# Patient Record
Sex: MALE | Race: WHITE | NOT HISPANIC OR LATINO | Employment: OTHER | URBAN - METROPOLITAN AREA
[De-identification: names, ages, dates, MRNs, and addresses within clinical notes are randomized per-mention and may not be internally consistent; named-entity substitution may affect disease eponyms.]

---

## 2020-07-09 ENCOUNTER — TRANSCRIBE ORDERS (OUTPATIENT)
Dept: PHYSICAL THERAPY | Facility: CLINIC | Age: 55
End: 2020-07-09

## 2020-07-09 DIAGNOSIS — M79.2 CERVICAL SPINE ARTHRITIS WITH NERVE PAIN: Primary | ICD-10-CM

## 2020-07-09 DIAGNOSIS — M47.812 CERVICAL SPINE ARTHRITIS WITH NERVE PAIN: Primary | ICD-10-CM

## 2020-07-17 ENCOUNTER — TREATMENT (OUTPATIENT)
Dept: PHYSICAL THERAPY | Facility: CLINIC | Age: 55
End: 2020-07-17

## 2020-07-17 DIAGNOSIS — M79.2 CERVICAL SPINE ARTHRITIS WITH NERVE PAIN: Primary | ICD-10-CM

## 2020-07-17 DIAGNOSIS — M47.812 CERVICAL SPINE ARTHRITIS WITH NERVE PAIN: Primary | ICD-10-CM

## 2020-07-17 PROCEDURE — 97012 MECHANICAL TRACTION THERAPY: CPT | Performed by: PHYSICAL THERAPIST

## 2020-07-17 PROCEDURE — 97161 PT EVAL LOW COMPLEX 20 MIN: CPT | Performed by: PHYSICAL THERAPIST

## 2020-07-17 NOTE — PROGRESS NOTES
Physical Therapy Initial Evaluation and Plan of Care    Patient: Nakul Brown   : 1965  Diagnosis/ICD-10 Code:  Cervical spine arthritis with nerve pain [M47.812, M79.2]  Referring practitioner: Collin Juárez MD PhD    Subjective Evaluation    History of Present Illness  Mechanism of injury: Pt reports to the clinic with neck pain beginning about a month ago that came on quickly and severely with a tingling sensation going down the medial side of his arm into his pinkie. He claims that the pain comes primarily when he is active throughout the day and the only thing that really relieves the pain is resting. The pain is also present in the mornings when he initially gets out of bed and stays pretty consistent throughout the day with it getting worse with strenuous activity. He also reported having low back pain.     Quality of life: good    Pain  Current pain ratin  At best pain rating: 3  At worst pain ratin  Quality: dull ache and radiating (tingling sensation down to pinkie on L hand)  Relieving factors: rest  Aggravating factors: overhead activity, outstretched reach, lifting, movement, standing and repetitive movement  Progression: no change    Diagnostic Tests  No diagnostic tests performed    Treatments  Treatments tried: has been doing stretches/exercises found for neck pain   Current treatment: physical therapy  Patient Goals  Patient goals for therapy: decreased pain, increased motion, increased strength and return to sport/leisure activities  Patient goal: be able to workout without increased pain           Objective          Tenderness   Cervical Spine   Tenderness in the spinous process.     Additional Tenderness Details  Palpable tenderness at C4-5 spinous processes and juntion with more severe tenderness at C7-T1 junction and spinous processes. Tenderness also at C2 transverse process.    Neurological Testing     Sensation   Cervical/Thoracic   Left   Intact: light  touch    Right   Intact: light touch    Comments   Left light touch: C8 T1 .     Shoulder   Left Shoulder   Intact: light touch    Right Shoulder   Intact: light touch    Wrist/Hand   Left   Diminished: light touch    Right   Intact: light touch    Reflexes   Left   Biceps (C5/C6): normal (2+)  Brachioradialis (C6): trace (1+)  Triceps (C7): trace (1+)    Right   Biceps (C5/C6): normal (2+)  Brachioradialis (C6): normal (2+)  Triceps (C7): trace (1+)    Additional Neurological Details  Diminished sensation at L C8/T1 dermatome distributions    Active Range of Motion   Cervical/Thoracic Spine   Cervical    Flexion: 56 degrees   Extension: 34 degrees   Left lateral flexion: 34 degrees   Right lateral flexion: 51 degrees   Left rotation: 40 degrees   Right rotation: 52 degrees   Left Shoulder   Normal active range of motion    Right Shoulder   Normal active range of motion    Additional Active Range of Motion Details  Increased symptoms with R cervical lateral flexion     Passive Range of Motion     Additional Passive Range of Motion Details  Restriction in AA motion towards L side    Strength/Myotome Testing     Left Shoulder     Planes of Motion   Abduction: 4-     Isolated Muscles   Upper trapezius: 5     Right Shoulder     Planes of Motion   Abduction: 5     Isolated Muscles   Upper trapezius: 5     Left Elbow   Flexion: 4-  Extension: 4+    Right Elbow   Flexion: 5  Extension: 5    Left Wrist/Hand   Wrist extension: 5  Wrist flexion: 5     (2nd hand position)    Trial 1: 83 lbs    Trial 2: 60 lbs    Trial 3: 60 lbs    Average: 67.67 lbs    Right Wrist/Hand   Wrist extension: 5  Wrist flexion: 5     (2nd hand position)     Trial 1: 79 lbs    Trial 2: 87 lbs    Trial 3: 80 lbs    Average: 82 lbs    Additional Strength Details  Cervical rotation 5/5 bilaterally   FCU 5/5 L, 5/5 R  Finger adduction 4/5 L, 5/5 R       Tests   Cervical     Left   Negative cervical distraction.     Additional Tests  Details  Distraction decreased symptoms and felt good to pt  Ulnar nerve tension test: negative          Assessment & Plan     Assessment  Impairments: abnormal or restricted ROM, activity intolerance, impaired physical strength and pain with function  Assessment details: Pt is a 54 year old male reporting to the clinic with neck pain and a tingling sensation traveling down his L medial arm into his 5th digit. The patient displayed decreased ROM with stiffness and increased symptoms with all cervical ROMs to the L side. He showed an increase in symptoms with R cervical lateral flexion. Neurological testing consisting of AROM, dermatomes, myotomes, and reflexes displayed decreased activity from the C5 myotome, C6/7 reflexes, and C8/T1 dermatome distributions. PROM in supine showed decreased ROM and pain with L rotation and lateral flexion. Distraction technique was tested and a decrease in pain and increase in pain-free ROM was noted. This patient will benefit from skilled physical therapy to decrease neck pain and neurological symptoms and to increase his ability to do recreational activities and improve his quality of life.   Prognosis: good  Functional Limitations: lifting, pushing, uncomfortable because of pain, sitting, standing, reaching overhead and unable to perform repetitive tasks  Goals  Plan Goals: Short term goals: 3 weeks  1. The patient will be able to run 10 minutes without pain increasing more than a 3/10 to improve his ability to participate in a cardiovascular exercise program.   2. Pt be able to lift weights for 30 minutes without pain increasing to a 4 or greater to improve his overall health.  3. Pt be able to achieve 40 degrees or greater of L cervical lateral flexion to display a decrease in pain and improvements in AROM and functional ability.    Long term goals: 6 weeks   1. Pt be able to workout for the 45 minutes without pain increasing to a 2/10 or greater to display a decrease in pain  and increased ability to do recreational activities.  2. Pt be able to achieve 45 degrees or greater of AROM cervical extension to improve his ability to look up and decrease pain symptoms  3. Pt be able to achieve 50 degrees of L cervical rotation AROM without increases in pain to improve his ability to look over his shoulder while driving.   4. Pt to improve his L biceps, shoulder abd, and finger adduction strength to 5/5 to improve his ability to workout without risk of injury.   5. Pt to improve his L  strength by 10# or greater to improve his  while holding weights/barbell during workouts.         Plan  Therapy options: will be seen for skilled physical therapy services  Planned modality interventions: electrical stimulation/Russian stimulation, TENS, traction, cryotherapy and thermotherapy (hydrocollator packs)  Planned therapy interventions: body mechanics training, flexibility, functional ROM exercises, home exercise program, joint mobilization, stretching, strengthening, spinal/joint mobilization, soft tissue mobilization, motor coordination training and manual therapy  Frequency: 2x week  Duration in visits: 12  Treatment plan discussed with: patient        History # of Personal Factors and/or Comorbidities: LOW (0)  Examination of Body System(s): # of elements: LOW (1-2)  Clinical Presentation: STABLE   Clinical Decision Making: LOW     Timed:         Manual Therapy:       mins  48752;     Therapeutic Exercise:         mins  23084;     Neuromuscular Swathi:        mins  36915;    Therapeutic Activity:          mins  84202;     Gait Training:           mins  18768;     Ultrasound:          mins  49444;    Ionto                                   mins   22490  Self Care                            mins   97458        Un-Timed:  Electrical Stimulation:         mins  02241 (MC );  Dry Needling          mins self-pay  Traction     12     mins 60527  Low Eval     48     Mins  70002  Mod Eval           Mins  20785  High Eval                            Mins  00793  Re-Eval                               mins  63524        Timed Treatment:   0   mins   Total Treatment:     60   mins  PT SIGNATURE: Carolina Espino, PT, DPT    DATE TREATMENT INITIATED: 7/17/2020    Initial Certification  Certification Period: 10/15/2020  I certify that the therapy services are furnished while this patient is under my care.  The services outlined above are required by this patient, and will be reviewed every 90 days.     PHYSICIAN: Collin Juárez MD PhD      DATE:     Please sign and return via fax to [phone].. Thank you, [facility].

## 2020-07-20 ENCOUNTER — TREATMENT (OUTPATIENT)
Dept: PHYSICAL THERAPY | Facility: CLINIC | Age: 55
End: 2020-07-20

## 2020-07-20 DIAGNOSIS — M47.812 CERVICAL SPINE ARTHRITIS WITH NERVE PAIN: Primary | ICD-10-CM

## 2020-07-20 DIAGNOSIS — M79.2 CERVICAL SPINE ARTHRITIS WITH NERVE PAIN: Primary | ICD-10-CM

## 2020-07-20 PROCEDURE — 97140 MANUAL THERAPY 1/> REGIONS: CPT | Performed by: PHYSICAL THERAPIST

## 2020-07-20 PROCEDURE — 97012 MECHANICAL TRACTION THERAPY: CPT | Performed by: PHYSICAL THERAPIST

## 2020-07-20 NOTE — PROGRESS NOTES
Physical Therapy Daily Progress Note    VISIT#: 2/12 in POC.     Subjective   Nakul Brown reports he is doing ok.  Felt better after traction last visit.     Pain Rating (0/10): 4    Objective     See Exercise, Manual, and Modality Logs for complete treatment.     Patient Education: Provided pt with HEP (OA and AA, Upper trap stretch)    Assessment/Plan   Pt continued to have a good response to traction.  Was able to get slightly more AA PROM today.    Plan  Progress per Plan of Care.           Timed:         Manual Therapy:   15      mins  60861;     Therapeutic Exercise:   5      mins  98470;     Neuromuscular Swathi:        mins  90880;    Therapeutic Activity:          mins  23828;     Gait Training:           mins  04455;     Ultrasound:          mins  11550;    Ionto                                   mins   16215  Self Care                            mins   80562    Un-Timed:  Electrical Stimulation:         mins  29287 ( );  Dry Needling          mins self-pay  Traction     12     mins 27252  Low Eval          Mins  52025  Mod Eval          Mins  99728  High Eval                            Mins  08155  Re-Eval                               mins  74980    Timed Treatment:   20   mins   Total Treatment:     32   mins    Carolina Espino, PT, DPT  Physical Therapist

## 2020-07-23 ENCOUNTER — TREATMENT (OUTPATIENT)
Dept: PHYSICAL THERAPY | Facility: CLINIC | Age: 55
End: 2020-07-23

## 2020-07-23 DIAGNOSIS — M54.2 PAIN, NECK: Primary | ICD-10-CM

## 2020-07-23 PROCEDURE — 97140 MANUAL THERAPY 1/> REGIONS: CPT | Performed by: PHYSICAL THERAPIST

## 2020-07-23 PROCEDURE — 97012 MECHANICAL TRACTION THERAPY: CPT | Performed by: PHYSICAL THERAPIST

## 2020-07-23 PROCEDURE — 97110 THERAPEUTIC EXERCISES: CPT | Performed by: PHYSICAL THERAPIST

## 2020-07-28 ENCOUNTER — TREATMENT (OUTPATIENT)
Dept: PHYSICAL THERAPY | Facility: CLINIC | Age: 55
End: 2020-07-28

## 2020-07-28 DIAGNOSIS — M54.2 PAIN, NECK: Primary | ICD-10-CM

## 2020-07-28 PROCEDURE — 97140 MANUAL THERAPY 1/> REGIONS: CPT | Performed by: PHYSICAL THERAPIST

## 2020-07-28 PROCEDURE — 97110 THERAPEUTIC EXERCISES: CPT | Performed by: PHYSICAL THERAPIST

## 2020-07-28 PROCEDURE — 97012 MECHANICAL TRACTION THERAPY: CPT | Performed by: PHYSICAL THERAPIST

## 2020-07-28 NOTE — PROGRESS NOTES
Physical Therapy Daily Progress Note    VISIT#: 4/12    Subjective   Nakul Brown reports feeling good and being able to move his neck more when looking up and to each side, but did say that he has a pain in his L ear that has been lingering for a couple days. Reports still being able to go to the gym and workout with no problems.   Pain Rating (0/10): 1    Objective     See Exercise, Manual, and Modality Logs for complete treatment.       Assessment/Plan  Pt was able to actively and passively move his neck further than previously seen. He responded well to manual therapy and was able to withstand increased pressure with STM and AA glides. Plan to measure cervical AROM next visit and focus more on thoracic spine mobility and strength.   Plan  Progress strengthening /stabilization /functional activity            Timed:         Manual Therapy:    15     mins  29544;     Therapeutic Exercise:    18     mins  76149;     Neuromuscular Swathi:        mins  11855;    Therapeutic Activity:          mins  68015;     Gait Training:           mins  99438;     Ultrasound:          mins  03951;    Ionto                                   mins   64520  Self Care                            mins   79457    Un-Timed:  Electrical Stimulation:         mins  04697 ( );  Dry Needling          mins self-pay  Traction     12     mins 88122  Low Eval          Mins  45642  Mod Eval          Mins  91869  High Eval                            Mins  34086  Re-Eval                               mins  17166    Timed Treatment:   33   mins   Total Treatment:    45    mins    Carolina Espino, PT, DPT  Physical Therapist

## 2020-07-30 ENCOUNTER — TREATMENT (OUTPATIENT)
Dept: PHYSICAL THERAPY | Facility: CLINIC | Age: 55
End: 2020-07-30

## 2020-07-30 DIAGNOSIS — M54.2 PAIN, NECK: Primary | ICD-10-CM

## 2020-07-30 PROCEDURE — 97012 MECHANICAL TRACTION THERAPY: CPT | Performed by: PHYSICAL THERAPIST

## 2020-07-30 PROCEDURE — 97110 THERAPEUTIC EXERCISES: CPT | Performed by: PHYSICAL THERAPIST

## 2020-07-30 PROCEDURE — 97140 MANUAL THERAPY 1/> REGIONS: CPT | Performed by: PHYSICAL THERAPIST

## 2020-07-30 NOTE — PROGRESS NOTES
Physical Therapy Daily Progress Note    VISIT#: 5/12 in POC    Subjective   Nakul Brown reports that he felt good after last treatment and is not experiencing any pain right now.  Pain Rating (0/10): 0    Objective     Cervical AROM  Flexion: 73 degrees   Extension: 47 degrees   Left lateral flexion: 38 degrees   Right lateral flexion: 63 degrees   Left rotation: 78 degrees   Right rotation: 69 degrees     NDI = 5% disability from 20% at initial evaluation    See Exercise, Manual, and Modality Logs for complete treatment.     Goals  Plan Goals: Short term goals: 3 weeks  1. The patient will be able to run 10 minutes without pain increasing more than a 3/10 to improve his ability to participate in a cardiovascular exercise program. - not tested  2. Pt be able to lift weights for 30 minutes without pain increasing to a 4 or greater to improve his overall health. - met  3. Pt be able to achieve 40 degrees or greater of L cervical lateral flexion to display a decrease in pain and improvements in AROM and functional ability. - not met    Long term goals: 6 weeks   1. Pt be able to workout for the 45 minutes without pain increasing to a 2/10 or greater to display a decrease in pain and increased ability to do recreational activities. - met  2. Pt be able to achieve 45 degrees or greater of AROM cervical extension to improve his ability to look up and decrease pain symptoms - met  3. Pt be able to achieve 50 degrees of L cervical rotation AROM without increases in pain to improve his ability to look over his shoulder while driving. - met  4. Pt to improve his L biceps, shoulder abd, and finger adduction strength to 5/5 to improve his ability to workout without risk of injury. - not tested  5. Pt to improve his L  strength by 10# or greater to improve his  while holding weights/barbell during workouts.  - not tested    Assessment/Plan  Pt felt no discomfort or tenderness during manual therapy. He is very active  and plans to continue going to the gym doing strength training 2-3 times/week and flexibility training 1-2 times/week with a . Pt showed increases in all cervical AROM's with all being WNL except for Left lateral flexion. Plan to schedule one appointment a couple weeks from today and assess then as well as discuss plan for d/c and exercises going forward. Will assess goals not tested/not met at next appointment.     Plan  Progress per Plan of Care and Anticipate DC next Visit            Timed:         Manual Therapy:    20     mins  12028;     Therapeutic Exercise:    10     mins  66324;     Neuromuscular Swathi:        mins  54719;    Therapeutic Activity:          mins  93420;     Gait Training:           mins  02311;     Ultrasound:          mins  95632;    Ionto                                   mins   51276  Self Care                            mins   15225    Un-Timed:  Electrical Stimulation:         mins  61110 ( );  Dry Needling          mins self-pay  Traction     12     mins 72723  Low Eval          Mins  02967  Mod Eval          Mins  50846  High Eval                            Mins  81804  Re-Eval                               mins  01893    Timed Treatment:   30   mins   Total Treatment:     42   mins    Carolina Espino, PT, DPT  Physical Therapist

## 2020-11-04 ENCOUNTER — DOCUMENTATION (OUTPATIENT)
Dept: PHYSICAL THERAPY | Facility: CLINIC | Age: 55
End: 2020-11-04

## 2020-11-04 NOTE — PROGRESS NOTES
Discharge Summary  Discharge Summary from Physical Therapy Report      Date of Initial PT visit: 07/17/2020  Number of Visits Seen: 5     Discharge Status of Patient: Pt made good progress with almost complete elimination of symptoms.  Able to work out at gym without difficulty.  Has not returned since 07/30/2020.    Goals: Partially Met  Plan Goals: Short term goals: 3 weeks  1. The patient will be able to run 10 minutes without pain increasing more than a 3/10 to improve his ability to participate in a cardiovascular exercise program. - not tested  2. Pt be able to lift weights for 30 minutes without pain increasing to a 4 or greater to improve his overall health. - met  3. Pt be able to achieve 40 degrees or greater of L cervical lateral flexion to display a decrease in pain and improvements in AROM and functional ability. - not met    Long term goals: 6 weeks   1. Pt be able to workout for the 45 minutes without pain increasing to a 2/10 or greater to display a decrease in pain and increased ability to do recreational activities. - met  2. Pt be able to achieve 45 degrees or greater of AROM cervical extension to improve his ability to look up and decrease pain symptoms - met  3. Pt be able to achieve 50 degrees of L cervical rotation AROM without increases in pain to improve his ability to look over his shoulder while driving. - met  4. Pt to improve his L biceps, shoulder abd, and finger adduction strength to 5/5 to improve his ability to workout without risk of injury. - not tested  5. Pt to improve his L  strength by 10# or greater to improve his  while holding weights/barbell during workouts.  - not tested    Discharge Plan: Continue with current home exercise program as instructed      Date of Discharge : 11/04/2020        Carolina Espino, PT, DPT  Physical Therapist

## 2023-08-14 ENCOUNTER — ANESTHESIA EVENT (OUTPATIENT)
Dept: GASTROENTEROLOGY | Facility: HOSPITAL | Age: 58
End: 2023-08-14
Payer: COMMERCIAL

## 2023-08-15 ENCOUNTER — HOSPITAL ENCOUNTER (OUTPATIENT)
Facility: HOSPITAL | Age: 58
Setting detail: HOSPITAL OUTPATIENT SURGERY
Discharge: HOME | End: 2023-08-15
Attending: INTERNAL MEDICINE | Admitting: INTERNAL MEDICINE
Payer: COMMERCIAL

## 2023-08-15 ENCOUNTER — ANESTHESIA (OUTPATIENT)
Dept: GASTROENTEROLOGY | Facility: HOSPITAL | Age: 58
End: 2023-08-15
Payer: COMMERCIAL

## 2023-08-15 ENCOUNTER — ON CAMPUS - OUTPATIENT (OUTPATIENT)
Dept: URBAN - METROPOLITAN AREA HOSPITAL 85 | Facility: HOSPITAL | Age: 58
End: 2023-08-15
Payer: COMMERCIAL

## 2023-08-15 VITALS
DIASTOLIC BLOOD PRESSURE: 81 MMHG | SYSTOLIC BLOOD PRESSURE: 137 MMHG | WEIGHT: 243.17 LBS | RESPIRATION RATE: 12 BRPM | TEMPERATURE: 98.9 F | HEART RATE: 68 BPM | BODY MASS INDEX: 34.04 KG/M2 | HEIGHT: 71 IN | OXYGEN SATURATION: 97 %

## 2023-08-15 DIAGNOSIS — Z86.010 PERSONAL HISTORY OF COLONIC POLYPS: ICD-10-CM

## 2023-08-15 DIAGNOSIS — D12.2 BENIGN NEOPLASM OF ASCENDING COLON: ICD-10-CM

## 2023-08-15 DIAGNOSIS — D12.8 BENIGN NEOPLASM OF RECTUM: ICD-10-CM

## 2023-08-15 DIAGNOSIS — K57.30 DIVERTICULOSIS OF LARGE INTESTINE WITHOUT PERFORATION OR ABS: ICD-10-CM

## 2023-08-15 LAB — GLUCOSE BLDC GLUCOMTR-MCNC: 166 MG/DL (ref 70–105)

## 2023-08-15 PROCEDURE — 88305 TISSUE EXAM BY PATHOLOGIST: CPT | Performed by: INTERNAL MEDICINE

## 2023-08-15 PROCEDURE — 82948 REAGENT STRIP/BLOOD GLUCOSE: CPT

## 2023-08-15 PROCEDURE — 45385 COLONOSCOPY W/LESION REMOVAL: CPT | Mod: 33 | Performed by: INTERNAL MEDICINE

## 2023-08-15 PROCEDURE — 25010000002 PROPOFOL 200 MG/20ML EMULSION: Performed by: NURSE ANESTHETIST, CERTIFIED REGISTERED

## 2023-08-15 RX ORDER — SODIUM CHLORIDE 9 MG/ML
30 INJECTION, SOLUTION INTRAVENOUS CONTINUOUS
Status: DISCONTINUED | OUTPATIENT
Start: 2023-08-15 | End: 2023-08-15 | Stop reason: HOSPADM

## 2023-08-15 RX ORDER — SODIUM CHLORIDE 9 MG/ML
INJECTION, SOLUTION INTRAVENOUS CONTINUOUS PRN
Status: DISCONTINUED | OUTPATIENT
Start: 2023-08-15 | End: 2023-08-15 | Stop reason: SURG

## 2023-08-15 RX ORDER — LIDOCAINE HYDROCHLORIDE 20 MG/ML
INJECTION, SOLUTION EPIDURAL; INFILTRATION; INTRACAUDAL; PERINEURAL AS NEEDED
Status: DISCONTINUED | OUTPATIENT
Start: 2023-08-15 | End: 2023-08-15 | Stop reason: SURG

## 2023-08-15 RX ORDER — PROPOFOL 10 MG/ML
INJECTION, EMULSION INTRAVENOUS AS NEEDED
Status: DISCONTINUED | OUTPATIENT
Start: 2023-08-15 | End: 2023-08-15 | Stop reason: SURG

## 2023-08-15 RX ADMIN — LIDOCAINE HYDROCHLORIDE 50 MG: 20 INJECTION, SOLUTION EPIDURAL; INFILTRATION; INTRACAUDAL; PERINEURAL at 07:40

## 2023-08-15 RX ADMIN — PROPOFOL 20 MG: 10 INJECTION, EMULSION INTRAVENOUS at 07:49

## 2023-08-15 RX ADMIN — SODIUM CHLORIDE: 9 INJECTION, SOLUTION INTRAVENOUS at 07:35

## 2023-08-15 RX ADMIN — PROPOFOL 150 MG: 10 INJECTION, EMULSION INTRAVENOUS at 07:40

## 2023-08-15 RX ADMIN — SODIUM CHLORIDE 30 ML/HR: 9 INJECTION, SOLUTION INTRAVENOUS at 06:49

## 2023-08-15 RX ADMIN — PROPOFOL 10 MG: 10 INJECTION, EMULSION INTRAVENOUS at 07:55

## 2023-08-15 NOTE — OP NOTE
COLONOSCOPY Procedure Report    Patient Name:  Nakul Brown  YOB: 1965    Date of Surgery:  8/15/2023     Pre-Op Diagnosis:  Personal history of colonic polyps [Z86.010]         Procedure/CPTr Codes:      Procedure(s):  COLONOSCOPY with polypectomy x 4    Staff:  Surgeon(s):  Brissa Gale MD      Anesthesia: Monitored Anesthesia Care    Description of Procedure:  A description of the procedure as well as risks, benefits and alternative methods were explained to the patient who voiced understanding and signed the corresponding consent form. A physical exam was performed and vital signs were monitored throughout the procedure.    A rectal exam was performed which was normal. An Olympus colonoscope was placed into the rectum and proceeded under direct visualization through the colon until the cecum and appendiceal orifice were identified. Careful visualization occurred upon slow withdraw of the scope. The scope was then retroflexed and the distal rectum was visualized. The quality of the prep was good. The procedure was not difficult and there were no immediate complications.    Findings:   Prep was fairly well, terminal ileum cecum ascending transverse ascending colon mucosa looks normal.  2 polyps were removed from ascending colon area 5 and 6 mm with a cold snare.  3 small polyp removed from the rectum measuring 3, 4 and 4 mm from the rectum area with a cold snare.  Diverticulosis noted to the left side of the colon with a moderate-sized internal/external hemorrhoids    Impression:  1.  Multiple polyps were removed as detailed above.  2.  Extensive diverticulosis.  3.  Moderate-sized internal/external hemorrhoids    Recommendations:  Follow up with GI clinic as needed  Follow up with PCP as scheduled  Follow up with biopsy report  Repeat colonoscopy in 5 years  Benefiber 1 scoop 2x/day       Brissa Gale MD     Date: 8/15/2023    Time: 08:03 EDT

## 2023-08-15 NOTE — ANESTHESIA POSTPROCEDURE EVALUATION
Patient: Nakul Brown    Procedure Summary       Date: 08/15/23 Room / Location: [facility] ENDOSCOPY 4 / [facility] ENDOSCOPY    Anesthesia Start: 0735 Anesthesia Stop: 0800    Procedure: COLONOSCOPY with polypectomy x 4 (Rectum) Diagnosis:       Personal history of colonic polyps      (Personal history of colonic polyps [Z86.010])    Surgeons: Brissa Gale MD Provider: Pablo Weiss MD    Anesthesia Type: general ASA Status: 3            Anesthesia Type: general    Vitals  Vitals Value Taken Time   /81 08/15/23 0828   Temp     Pulse 69 08/15/23 0833   Resp 12 08/15/23 0828   SpO2 95 % 08/15/23 0833   Vitals shown include unvalidated device data.        Post Anesthesia Care and Evaluation    Patient location during evaluation: PHASE II  Patient participation: complete - patient participated  Level of consciousness: awake  Pain scale: See nurse's notes for pain score.  Pain management: adequate    Airway patency: patent  Anesthetic complications: No anesthetic complications  PONV Status: none  Cardiovascular status: acceptable  Respiratory status: acceptable and spontaneous ventilation  Hydration status: acceptable    Comments: Patient seen and examined postoperatively; vital signs stable; SpO2 greater than or equal to 90%; cardiopulmonary status stable; nausea/vomiting adequately controlled; pain adequately controlled; no apparent anesthesia complications; patient discharged from anesthesia care when discharge criteria were met

## 2023-08-15 NOTE — H&P
Patient Care Team:  Damian Barragan MD as PCP - General (Internal Medicine)      Subjective .     History of present illness:    Nakul Brown is a 57 y.o. male who presents today for Procedure(s):  COLONOSCOPY with polypectomy x 4 for the indications listed below.     The updated Patient Profile was reviewed prior to the procedure, in conjunction with the Physical Exam, including medical conditions, surgical procedures, medications, allergies, family history and social history.     Pre-operatively, I reviewed the indication(s) for the procedure, the risks of the procedure [including but not limited to: unexpected bleeding possibly requiring hospitalization and/or unplanned repeat procedures, perforation possibly requiring surgical treatment, missed lesions and complications of sedation/MAC (also explained by anesthesia staff)].     I have evaluated the patient for risks associated with the planned anesthesia and the procedure to be performed and find the patient an acceptable candidate for IV sedation.    Multiple opportunities were provided for any questions or concerns, and all questions were answered satisfactorily before any anesthesia was administered. We will proceed with the planned procedure.      ASSESSMENT/PLAN:  57 years old male with a history of polyps    Past Medical History:  Past Medical History:   Diagnosis Date    Afib     Deaf     left ear    Depression     Diabetes mellitus     GERD (gastroesophageal reflux disease)     Gout     Hypertension     Sleep apnea     cpap, severe SA       Past Surgical History:  Past Surgical History:   Procedure Laterality Date    APPENDECTOMY      BRAIN BIOPSY      brain tumor removed negative for cancer       Social History:  Social History     Tobacco Use    Smoking status: Former     Packs/day: 3.00     Years: 10.00     Pack years: 30.00     Types: Cigars, Cigarettes     Quit date: 3/22/2021     Years since quittin.4   Substance Use Topics    Alcohol  use: Yes     Alcohol/week: 4.0 standard drinks     Types: 3 Cans of beer, 1 Shots of liquor per week    Drug use: Not Currently       Family History:  History reviewed. No pertinent family history.    Medications:  Medications Prior to Admission   Medication Sig Dispense Refill Last Dose    allopurinol (ZYLOPRIM) 300 MG tablet allopurinol 300 mg tablet   8/14/2023    aspirin 81 MG EC tablet Take 1 tablet by mouth Daily. Hold DOS   8/14/2023    atorvastatin (LIPITOR) 40 MG tablet Take 1 tablet by mouth Daily.   8/14/2023    DULoxetine (CYMBALTA) 60 MG capsule Take 1 capsule by mouth Daily.   8/14/2023    metFORMIN (GLUCOPHAGE) 500 MG tablet Take 1 tablet by mouth 2 (Two) Times a Day With Meals. Hold DOS   8/14/2023    metoprolol succinate XL (TOPROL-XL) 25 MG 24 hr tablet Take 1 tablet by mouth Daily.   8/14/2023    pantoprazole (PROTONIX) 40 MG EC tablet Take 1 tablet by mouth Daily.   8/14/2023    polycarbophil 625 MG tablet tablet Take 1 tablet by mouth Daily.   8/14/2023    amoxicillin-clavulanate (Augmentin) 875-125 MG per tablet Take 1 tablet by mouth Every 12 (Twelve) Hours. 20 tablet 0        Scheduled Meds:   Continuous Infusions:sodium chloride, 30 mL/hr, Last Rate: 30 mL/hr (08/15/23 0649)      PRN Meds:.    ALLERGIES:  Linaclotide        Objective     Vital Signs:   Temp:  [98.9 øF (37.2 øC)] 98.9 øF (37.2 øC)  Heart Rate:  [78] 78  Resp:  [14] 14  BP: (153)/(83) 153/83    Physical Exam:      General Appearance:    Awake and alert, in no acute distress   Lungs:     Clear to auscultation bilaterally, respirations regular, even and unlabored    Heart:    Regular rhythm and normal rate, normal S1 and S2, no            murmur, no gallop, no rub   Abdomen:     Normal bowel sounds, soft, non-tender, no rebound or guarding, non-distended, no hepatosplenomegaly        Results Review:   I reviewed the patient's labs and imaging.  Lab Results (last 24 hours)       Procedure Component Value Units Date/Time    POC  Glucose Once [[id number]]  (Abnormal) Collected: 08/15/23 0635    Specimen: Blood Updated: 08/15/23 0639     Glucose 166 mg/dL      Comment: Serial Number: [id number]:  [id number]               Imaging Results (Last 24 Hours)       ** No results found for the last 24 hours. **               I discussed the patients findings and my recommendations with the patient.  Brissa Gale MD  08/15/23  08:03 EDT

## 2023-08-15 NOTE — DISCHARGE INSTRUCTIONS
A responsible adult should stay with you and you should rest quietly for the rest of the day.    Do not drink alcohol, drive, operate any heavy machinery or power tools or make any legal/important decisions for the next 24 hours.     Progress your diet as tolerated.  If you begin to experience severe pain, increased shortness of breath, racing heartbeat or a fever above 101 F, seek immediate medical attention.     Follow up with MD as instructed. Call office for results in 3 to 5 days if needed.     For further questions please call Dr. Gale's office at [phone]    Recommendations:  Follow up with GI clinic as needed  Follow up with PCP as scheduled  Follow up with biopsy report  Repeat colonoscopy in 5 years  Benefiber 1 scoop 2x/day

## 2023-08-15 NOTE — ANESTHESIA PREPROCEDURE EVALUATION
Anesthesia Evaluation     Patient summary reviewed and Nursing notes reviewed   NPO Solid Status: > 8 hours  NPO Liquid Status: > 8 hours           Airway   Mallampati: II  TM distance: >3 FB  Neck ROM: full  No difficulty expected  Dental - normal exam     Pulmonary - normal exam    breath sounds clear to auscultation  (+) a smoker Abstained day of surgery,  Cardiovascular - normal exam  Exercise tolerance: unable to assess    ECG reviewed  Rhythm: regular  Rate: normal    (+) hypertension      Neuro/Psych  (+) psychiatric history Anxiety  GI/Hepatic/Renal/Endo    (+) obesity, GERD, diabetes mellitus    Musculoskeletal (-) negative ROS    Abdominal  - normal exam   Substance History - negative use     OB/GYN negative ob/gyn ROS         Other - negative ROS                     Anesthesia Plan    ASA 3     general     intravenous induction     Anesthetic plan, risks, benefits, and alternatives have been provided, discussed and informed consent has been obtained with: patient.    CODE STATUS:

## 2023-08-16 LAB
LAB AP CASE REPORT: NORMAL
PATH REPORT.FINAL DX SPEC: NORMAL
PATH REPORT.GROSS SPEC: NORMAL

## (undated) DEVICE — SNAR POLYP HOTSNARE/BRAIDED OVL/MINI 7F 2.8X10MM 230CM 1P/U

## (undated) DEVICE — PK ENDO GI 50